# Patient Record
Sex: FEMALE | Race: WHITE
[De-identification: names, ages, dates, MRNs, and addresses within clinical notes are randomized per-mention and may not be internally consistent; named-entity substitution may affect disease eponyms.]

---

## 2021-04-14 ENCOUNTER — HOSPITAL ENCOUNTER (OUTPATIENT)
Dept: HOSPITAL 47 - RADUSWWP | Age: 46
Discharge: HOME | End: 2021-04-14
Attending: FAMILY MEDICINE
Payer: COMMERCIAL

## 2021-04-14 DIAGNOSIS — R16.0: Primary | ICD-10-CM

## 2021-04-14 DIAGNOSIS — K80.20: ICD-10-CM

## 2021-04-14 PROCEDURE — 76700 US EXAM ABDOM COMPLETE: CPT

## 2021-04-14 NOTE — US
EXAMINATION TYPE: US abdomen complete

 

DATE OF EXAM: 4/14/2021

 

COMPARISON: NONE

 

CLINICAL HISTORY: K80.80 cholelithiasis without obstruction. not symptomatic, assess to see if surger
y is needed. 

 

EXAM MEASUREMENTS:

 

Liver Length:  19.0 cm   

Gallbladder Wall:  0.2 cm   

CBD:  0.4 cm

Spleen:  12.7 cm   

Right Kidney:  11.3 x 4.4 x 4.3 cm 

Left Kidney:  11.8 x 5.2 x 5.6 cm   

 

 

 

Pancreas:  limited views appear wnl

Liver:  difficult to penetrate, upper limits of normal for size  

Gallbladder:  multiple stone see, when rolled LLD some appeared to be mobile, no wall thickening   

**Evidence for sonographic Lorenzo's sign:  no

CBD:  wnl 

Spleen:  wnl   

Right Kidney:  wnl   

Left Kidney:  wnl   

Upper IVC:  wnl  

Abd Aorta:  wnl

 

IMPRESSION: 

1. Hepatomegaly.

2. Cholelithiasis.

## 2021-04-30 ENCOUNTER — HOSPITAL ENCOUNTER (OUTPATIENT)
Dept: HOSPITAL 47 - RADMAMWWP | Age: 46
Discharge: HOME | End: 2021-04-30
Attending: FAMILY MEDICINE
Payer: COMMERCIAL

## 2021-04-30 DIAGNOSIS — Z12.31: Primary | ICD-10-CM

## 2021-04-30 PROCEDURE — 77067 SCR MAMMO BI INCL CAD: CPT

## 2021-05-03 NOTE — MM
Reason for exam: screening  (asymptomatic).

Last mammogram was performed 1 year and 8 months ago.



Physical Findings:

A clinical breast exam by your physician is recommended on an annual basis and 

results should be correlated with mammographic findings.



MG Screening Mammo w CAD

Bilateral CC and MLO view(s) were taken.

Prior study comparison: September 13, 2019, bilateral MG screening mammo w CAD.  

October 31, 2017, mammogram, performed at [facility].

The breast tissue is heterogeneously dense. This may lower the sensitivity of 

mammography.  There is no discrete abnormality.





ASSESSMENT: Negative, BI-RAD 1



RECOMMENDATION:

Routine screening mammogram of both breasts in 1 year.

## 2021-07-09 ENCOUNTER — HOSPITAL ENCOUNTER (OUTPATIENT)
Dept: HOSPITAL 47 - OR | Age: 46
Discharge: HOME | End: 2021-07-09
Attending: SURGERY
Payer: COMMERCIAL

## 2021-07-09 VITALS — DIASTOLIC BLOOD PRESSURE: 74 MMHG | HEART RATE: 59 BPM | SYSTOLIC BLOOD PRESSURE: 129 MMHG

## 2021-07-09 VITALS — RESPIRATION RATE: 20 BRPM

## 2021-07-09 VITALS — BODY MASS INDEX: 32.5 KG/M2

## 2021-07-09 VITALS — TEMPERATURE: 97.5 F

## 2021-07-09 DIAGNOSIS — I11.9: ICD-10-CM

## 2021-07-09 DIAGNOSIS — Z79.899: ICD-10-CM

## 2021-07-09 DIAGNOSIS — I83.90: ICD-10-CM

## 2021-07-09 DIAGNOSIS — K80.10: Primary | ICD-10-CM

## 2021-07-09 DIAGNOSIS — E78.5: ICD-10-CM

## 2021-07-09 DIAGNOSIS — K66.0: ICD-10-CM

## 2021-07-09 DIAGNOSIS — E66.9: ICD-10-CM

## 2021-07-09 DIAGNOSIS — G51.0: ICD-10-CM

## 2021-07-09 DIAGNOSIS — E11.9: ICD-10-CM

## 2021-07-09 DIAGNOSIS — Z79.84: ICD-10-CM

## 2021-07-09 LAB
ANION GAP SERPL CALC-SCNC: 9 MMOL/L
BUN SERPL-SCNC: 13 MG/DL (ref 7–17)
CALCIUM SPEC-MCNC: 9.8 MG/DL (ref 8.4–10.2)
CHLORIDE SERPL-SCNC: 101 MMOL/L (ref 98–107)
CO2 SERPL-SCNC: 29 MMOL/L (ref 22–30)
ERYTHROCYTE [DISTWIDTH] IN BLOOD BY AUTOMATED COUNT: 4.73 M/UL (ref 3.8–5.4)
ERYTHROCYTE [DISTWIDTH] IN BLOOD: 14.2 % (ref 11.5–15.5)
GLUCOSE BLD-MCNC: 115 MG/DL (ref 75–99)
GLUCOSE BLD-MCNC: 155 MG/DL (ref 75–99)
GLUCOSE SERPL-MCNC: 111 MG/DL (ref 74–99)
HCT VFR BLD AUTO: 40.9 % (ref 34–46)
HGB BLD-MCNC: 13.7 GM/DL (ref 11.4–16)
MCH RBC QN AUTO: 28.9 PG (ref 25–35)
MCHC RBC AUTO-ENTMCNC: 33.4 G/DL (ref 31–37)
MCV RBC AUTO: 86.5 FL (ref 80–100)
PLATELET # BLD AUTO: 199 K/UL (ref 150–450)
POTASSIUM SERPL-SCNC: 3.8 MMOL/L (ref 3.5–5.1)
SODIUM SERPL-SCNC: 139 MMOL/L (ref 137–145)
WBC # BLD AUTO: 9.9 K/UL (ref 3.8–10.6)

## 2021-07-09 PROCEDURE — 80048 BASIC METABOLIC PNL TOTAL CA: CPT

## 2021-07-09 PROCEDURE — 47564 LAPARO CHOLECYSTECTOMY/EXPLR: CPT

## 2021-07-09 PROCEDURE — 88304 TISSUE EXAM BY PATHOLOGIST: CPT

## 2021-07-09 PROCEDURE — 81025 URINE PREGNANCY TEST: CPT

## 2021-07-09 PROCEDURE — 85027 COMPLETE CBC AUTOMATED: CPT

## 2021-07-09 NOTE — P.OP
Date of Procedure: 07/09/21


Description of Procedure: 








SURGEON:  ASHANTI GARCIA MD





PREOPERATIVE DIAGNOSES:  


1.  Chronic cholecystitis with gallstones


2.  Obesity due to excess calories, BMI 33.0


3.  Hypertensive heart disease


4.  Hyperlipidemia


5.  Diabetes type 2, non-insulin-dependent





POSTOPERATIVE DIAGNOSES:  


1.  Chronic cholecystitis with gallstones


2.  Obesity due to excess calories, BMI 33.0


3.  Hypertensive heart disease


4.  Hyperlipidemia


5.  Diabetes type 2, non-insulin-dependent


6.  Peritoneal adhesions, right upper quadrant





OPERATION:       


1.  Robotic-assisted da Saman Xi laparoscopic lysis of adhesions


2.  Robotic-assisted da Saman Xi laparoscopic cholecystectomy, multiport with 

FIREFLY


 


ESTIMATED BLOOD LOSS:  20 mL.


SPECIMENS REMOVED:  Gallbladder.


COMPLICATIONS:  None.





OPERATIVE FINDINGS:  


1.  Scarring over entire gallbladder with peritoneal adhesions, pericholecystic 

with features of chronic cholecystitis


2.  Generalized oozing from incisions


3.  Dome down technique performed


4.  Common bilde duct confirmed with indocyanine green





INDICATIONS: The patient is a 46-year-old female who presents with symptomatic 

gallstones.  Robotic assisted laparoscopic approach was described. Benefits and 

risks of the procedure including but not limited to bleeding, infection, injury 

to the biliary tree was described. Informed consent was obtained.  





DESCRIPTION OF PROCEDURE: Patient was brought to the operating room, 


placed in supine position. After general induction, the abdomen had 


been prepped and draped in standard sterile fashion. The robotic da Saman 


XI system was primed.  





After a timeout protocol was performed, the patient had been prepped 


and draped in standard sterile fashion.





The patient was injected with indocyanine green.





A 5 mm 0 degrees laparoscopic trocar entry was performed along the left upper 

quadrant.  The abdomen insufflated to 15 mmHg pressure which was tolerated well.

Diagnostic laparoscopy demonstrated no injury to bowel viscera or mesentery.  

The liver surface was unremarkable.  Next, two 8 mm robotic ports were placed 

along the right upper abdomen. The camera 8-mm port was maintained along the 

epigastrium.  Another 8 mm port was placed along the left upper abdominal wall 

after exchanging the 5 mm port. Please note that the ports were placed at least 

10 to 15 cm away from the target anatomy of the gallbladder. 





The robot was docked along the left lateral abdomen. 


The patient was repositioned in reverse Trendelenburg position. 





Using a grasper for arm 3, a grasper for arm 4, including hook cautery for arm 

1, the 


robotic system was docked and primed as described.  


Instruments were interchanged by the assistant including hook cautery, Bovie c

autery and clip appliers.





I had sat at the console. 





The gallbladder was scarred with peritoneal adhesions and bulbous Lysis of 

adhesions was performed to free the gallbladder from the surrounding tissues.  

Dome down technique was proposed starting from the fundus towards the 

infundibulum along its peritoneal attachments due to bulbous gallbladder.  Next 

attention was brought to the infundibulum and cystic structures.  The 

infundibulum and cystic duct were dissected free from surrounding tissues.  The 

cystic duct was isolated.





FIREFLY was used to identify the cystic artery and cystic structures.





A critical view of safety was obtained.





Large PLASTIC clips were used throughout the entire case.


Using a clip applier, 2 clips were placed at the junction of the infundibulum 

and cystic duct.


The cystic duct was divided between clips. Next, the cystic artery


was similarly clipped and cauterized.





Electro-Bovie cautery was used to remove the gallbladder from the


hepatic fossa. Hemostasis was checked and found to be adequate. 





The robot was undocked.





I re-scrubbed into the case.





Using a 10 mm Endo Catch bag via the left upper quadrant incision, the 


specimen was removed from the abdominal cavity.  





All pneumoperitoneum instruments were evacuated from the abdominal 


cavity. The incisions were reapproximated using 4-0 Monocryl in an interrupted 


subcuticular fashion.  Fascial defects were less than 8 mm in size.  


Please note along the trocar sites, local anesthetic was placed as a field block




prior to insertion of all instruments.  





Liquid glue was applied to the skin.





At the end of the procedure needle, sponge, and instrument count had 


been verified correct by the surgical technician. The patient was 


transferred to postanesthesia care unit in stable condition.





Intraoperative films were shared with the patient's family.





Plan - Discharge Summary


Discharge Rx Participant: Yes


New Discharge Prescriptions: 


New


   Acetaminophen Tab [Tylenol Tab] 1,000 mg PO Q6HR PRN #30 tablet


     PRN Reason: Pain


   Simethicone [Gas-X] 125 mg PO AC-TID PRN #20 capsule


     PRN Reason: Pain


   Ibuprofen [Motrin] 600 mg PO Q8HR PRN #30 tab


     PRN Reason: Pain





Continue


   Multivitamins, Thera [Multivitamin (formulary)] 1 tab PO DAILY


   metFORMIN HCL [Glucophage] 500 mg PO PC-LUNCH


   Atorvastatin [Lipitor] 40 mg PO DAILY


   Acyclovir 400 mg PO DAILY


   hydroCHLOROthiazide 50 mg PO DAILY


Discharge Medication List





Acyclovir 400 mg PO DAILY 07/06/21 [History]


Atorvastatin [Lipitor] 40 mg PO DAILY 07/06/21 [History]


Multivitamins, Thera [Multivitamin (formulary)] 1 tab PO DAILY 07/06/21 

[History]


hydroCHLOROthiazide 50 mg PO DAILY 07/06/21 [History]


metFORMIN HCL [Glucophage] 500 mg PO PC-LUNCH 07/06/21 [History]


Acetaminophen Tab [Tylenol Tab] 1,000 mg PO Q6HR PRN #30 tablet 07/09/21 [Rx]


Ibuprofen [Motrin] 600 mg PO Q8HR PRN #30 tab 07/09/21 [Rx]


Simethicone [Gas-X] 125 mg PO AC-TID PRN #20 capsule 07/09/21 [Rx]








Follow up Appointment(s)/Referral(s): 


Ashanti Garcia MD [STAFF PHYSICIAN] - 07/13/21 (CALL FOR TIME)


Patient Instructions/Handouts:  *[facility] - Managing Your Pain After Surgery 

Without Opioids, *[facility] - (Anesthesia) Discharge Instructions Outpatient 

Surgery, Low Fat Diet (DC), Laparoscopic Cholecystectomy (GEN)


Activity/Diet/Wound Care/Special Instructions: 


Recommend low-fat diet for the next 2 days.





No lifting over 10 pounds in 2 weeks until July 23rd.  May shower. No bath tub 

soaks for two weeks until July 23rd. 





Diet as tolerated. 





Use Tylenol, simethicone and ibuprofen or Aleve scheduled for the next 24-48 

hours for best pain relief.





Use ice along incisions for today to prevent swelling.


Discharge Disposition: HOME SELF-CARE

## 2021-07-09 NOTE — P.GSHP
History of Present Illness


H&P Date: 07/09/21











CHIEF COMPLAINT: Cholecystitis 





HISTORY OF PRESENT ILLNESS: The patient is a 34-year-old female who presents 

with history of epigastric including right upper quadrant abdominal pain.  She 

underwent diagnostic studies for her gallbladder.  Separately her clinical 

picture was consistent with cholecystitis.  Now she presents for surgical 

intervention.





PAST MEDICAL HISTORY: 


Please see list





PAST SURGICAL HISTORY: 


Please see list





MEDICATIONS: 


Please see list





ALLERGIES: Please see list





SOCIAL HISTORY: Please see list





FAMILY HISTORY: Please see list





REVIEW OF ORGAN SYSTEMS: 


CONSTITUTIONAL: No reports of fevers or chills. 


HEENT: Denies any troubles with the vision or hearing. 


ENDOCRINE: No reports of hypothyroidism. Has diabetes. 


RESPIRATORY: No recent pneumonias.


CARDIOVASCULAR: Denies chest pain or palpitations. Has hypertension


GI:  No blood in stools or constipation. 


MUSCULOSKELETAL: Has occasional joint pain including back pain. 


NEURO: No seizure disorders or headaches.  No recent stroke.


PSYCH: No depression or suicidal ideation. 


GENITOURINARY: No active blood in urine.  No urinary hesitancy.


HEMATOLOGIC: No personal or family history of DVTs or pulmonary emboli.  


SKIN: No skin cancer.





PHYSICAL EXAM: 


VITAL SIGNS: 


Afebrile vital signs stable


GENERAL: Well-developed pleasant in no acute distress. 


HEENT: No scleral icterus. Extraocular movements grossly intact. Moist buccal 

mucosa. 


NECK: Supple without lymphadenopathy. 


CHEST: Unlabored respirations. Equal bilateral excursions. 


CARDIOVASCULAR: Regular rate regular rhythm rhythm. Distal 2+ pulses. 


ABDOMEN: Soft, nondistended.  Tender along the epigastrium and right upper 

quadrant.


MUSCULOSKELETAL: No clubbing, cyanosis, or edema. 


NEURO: Cranial nerves II to XII within normal limits. No focal or lateralizing 

signs.


PSYCH: Alert and oriented to person, place and time. 


SKIN: Well-perfused good skin turgor.





ASSESSMENT: 


1.  Epigastric and right upper quadrant abdominal pain


2.  Chronic cholecystitis


3.  Symptomatic gallstones.





PLAN: 


1.  Will need a robotic cholecystectomy possible open.  Benefits and risks were 

described. 


2.  Heparin for DVT prophylaxis 5000 units.


3.  Antibiotic prophylaxis.





Past Medical History


Past Medical History: Hyperlipidemia, Hypertension


Additional Past Medical History / Comment(s): states "gallstones" states takes 

metformin "not for diabetes"  hx of hepatitis, hx of varicose veins, hx of bells

palsy


History of Any Multi-Drug Resistant Organisms: None Reported


Past Surgical History: No Surgical Hx Reported


Additional Past Surgical History / Comment(s): colonoscopy, molars removed


Past Anesthesia/Blood Transfusion Reactions: No Reported Reaction


Smoking Status: Never smoker





Medications and Allergies


                                Home Medications











 Medication  Instructions  Recorded  Confirmed  Type


 


Acyclovir 400 mg PO DAILY 07/06/21 07/06/21 History


 


Atorvastatin [Lipitor] 40 mg PO DAILY 07/06/21 07/06/21 History


 


Multivitamins, Thera [Multivitamin 1 tab PO DAILY 07/06/21 07/06/21 History





(formulary)]    


 


hydroCHLOROthiazide 50 mg PO DAILY 07/06/21 07/06/21 History


 


metFORMIN HCL [Glucophage] 500 mg PO PC-LUNCH 07/06/21 07/06/21 History








                                    Allergies











Allergy/AdvReac Type Severity Reaction Status Date / Time


 


No Known Allergies Allergy   Verified 07/06/21 14:42

## 2022-08-10 ENCOUNTER — HOSPITAL ENCOUNTER (OUTPATIENT)
Dept: HOSPITAL 47 - PROCWHC3 | Age: 47
End: 2022-08-10
Attending: PHYSICIAN ASSISTANT
Payer: COMMERCIAL

## 2022-08-10 VITALS — TEMPERATURE: 98.4 F | RESPIRATION RATE: 16 BRPM

## 2022-08-10 VITALS — SYSTOLIC BLOOD PRESSURE: 115 MMHG | DIASTOLIC BLOOD PRESSURE: 83 MMHG | HEART RATE: 88 BPM

## 2022-08-10 DIAGNOSIS — U07.1: Primary | ICD-10-CM

## 2022-08-10 DIAGNOSIS — E66.9: ICD-10-CM

## 2022-09-06 ENCOUNTER — HOSPITAL ENCOUNTER (OUTPATIENT)
Dept: HOSPITAL 47 - RADMAMWWP | Age: 47
Discharge: HOME | End: 2022-09-06
Attending: FAMILY MEDICINE
Payer: COMMERCIAL

## 2022-09-06 DIAGNOSIS — Z12.31: Primary | ICD-10-CM

## 2022-09-06 PROCEDURE — 77063 BREAST TOMOSYNTHESIS BI: CPT

## 2022-09-06 PROCEDURE — 77067 SCR MAMMO BI INCL CAD: CPT

## 2022-09-07 NOTE — MM
Reason for Exam: Screening  (asymptomatic). 

Last mammogram was performed 1 year(s) and 5 month(s) ago. 





Patient History: 

Menarche at age 15. First Full-Term Pregnancy at age 25. Patient has history of breast feeding.

Last menstrual period: 08/22/2022





Risk Values: 

Penelope 5 year model risk: 0.7%.

NCI Lifetime model risk: 8.6%.





Prior Study Comparison: 

2/6/2004 Bilateral Diagnostic Ultrasound, [facility]. 10/31/2017  Screening Mammogram, [facility]. 9/13/2019 Bilateral Screening Mammogram, [facility]. 4/30/2021 Bilateral Screening Mammogram,

[facility]. 





Tissue Density: 

The breast tissue is heterogeneously dense. This may lower the sensitivity of mammography.





Findings: 

Analyzed By CAD. 

No significant changes when compared with prior studies.

Focal asymmetry stable inner left CC view. 





Overall Assessment: Benign, BI-RAD 2





Management: 

Screening Mammogram of both breasts in 1 year.

A clinical breast exam by your physician is recommended on an annual basis and results should be

correlated with mammographic findings.



Electronically signed and approved by: Leopold M. Fregoli, M.D. Radiologis

## 2022-09-17 ENCOUNTER — HOSPITAL ENCOUNTER (OUTPATIENT)
Dept: HOSPITAL 47 - LABWHC1 | Age: 47
Discharge: HOME | End: 2022-09-17
Attending: NURSE PRACTITIONER
Payer: COMMERCIAL

## 2022-09-17 DIAGNOSIS — E78.5: ICD-10-CM

## 2022-09-17 DIAGNOSIS — I10: Primary | ICD-10-CM

## 2022-09-17 DIAGNOSIS — E28.2: ICD-10-CM

## 2022-09-17 LAB
ALBUMIN SERPL-MCNC: 4.6 G/DL (ref 3.8–4.9)
ALBUMIN/GLOB SERPL: 2.11 G/DL (ref 1.6–3.17)
ALP SERPL-CCNC: 80 U/L (ref 41–126)
ALT SERPL-CCNC: 20 U/L (ref 8–44)
ANION GAP SERPL CALC-SCNC: 15.3 MMOL/L (ref 10–18)
AST SERPL-CCNC: 19 U/L (ref 13–35)
BASOPHILS # BLD AUTO: 0.05 X 10*3/UL (ref 0–0.1)
BASOPHILS NFR BLD AUTO: 0.6 %
BUN SERPL-SCNC: 12.1 MG/DL (ref 9–27)
BUN/CREAT SERPL: 16.85 RATIO (ref 12–20)
CALCIUM SPEC-MCNC: 9.9 MG/DL (ref 8.7–10.3)
CHLORIDE SERPL-SCNC: 98 MMOL/L (ref 96–109)
CHOLEST SERPL-MCNC: 138 MG/DL (ref 0–200)
CK SERPL-CCNC: 92 U/L (ref 26–186)
CO2 SERPL-SCNC: 26.5 MMOL/L (ref 20–27.5)
EOSINOPHIL # BLD AUTO: 0.32 X 10*3/UL (ref 0.04–0.35)
EOSINOPHIL NFR BLD AUTO: 4.1 %
ERYTHROCYTE [DISTWIDTH] IN BLOOD BY AUTOMATED COUNT: 4.25 X 10*6/UL (ref 4.1–5.2)
ERYTHROCYTE [DISTWIDTH] IN BLOOD: 13.2 % (ref 11.5–14.5)
GLOBULIN SER CALC-MCNC: 2.2 G/DL (ref 1.6–3.3)
GLUCOSE SERPL-MCNC: 108 MG/DL (ref 70–110)
HCT VFR BLD AUTO: 37.7 % (ref 37.2–46.3)
HDLC SERPL-MCNC: 41.6 MG/DL (ref 40–60)
HGB BLD-MCNC: 12.8 G/DL (ref 12–15)
IMM GRANULOCYTES BLD QL AUTO: 0.5 %
LDLC SERPL CALC-MCNC: 67.2 MG/DL (ref 0–131)
LYMPHOCYTES # SPEC AUTO: 1.68 X 10*3/UL (ref 0.9–5)
LYMPHOCYTES NFR SPEC AUTO: 21.6 %
MCH RBC QN AUTO: 30.1 PG (ref 27–32)
MCHC RBC AUTO-ENTMCNC: 34 G/DL (ref 32–37)
MCV RBC AUTO: 88.7 FL (ref 80–97)
MONOCYTES # BLD AUTO: 0.62 X 10*3/UL (ref 0.2–1)
MONOCYTES NFR BLD AUTO: 8 %
NEUTROPHILS # BLD AUTO: 5.08 X 10*3/UL (ref 1.8–7.7)
NEUTROPHILS NFR BLD AUTO: 65.2 %
NRBC BLD AUTO-RTO: 0 /100 WBCS (ref 0–0)
PLATELET # BLD AUTO: 211 X 10*3/UL (ref 140–440)
POTASSIUM SERPL-SCNC: 3.4 MMOL/L (ref 3.5–5.5)
PROT SERPL-MCNC: 6.8 G/DL (ref 6.2–8.2)
SODIUM SERPL-SCNC: 140 MMOL/L (ref 135–145)
T4 FREE SERPL-MCNC: 1.24 NG/DL (ref 0.8–1.8)
TRIGL SERPL-MCNC: 146 MG/DL (ref 0–149)
VLDLC SERPL CALC-MCNC: 29.2 MG/DL (ref 5–40)
WBC # BLD AUTO: 7.79 X 10*3/UL (ref 4.5–10)

## 2022-09-17 PROCEDURE — 83525 ASSAY OF INSULIN: CPT

## 2022-09-17 PROCEDURE — 84439 ASSAY OF FREE THYROXINE: CPT

## 2022-09-17 PROCEDURE — 80061 LIPID PANEL: CPT

## 2022-09-17 PROCEDURE — 82672 ASSAY OF ESTROGEN: CPT

## 2022-09-17 PROCEDURE — 84402 ASSAY OF FREE TESTOSTERONE: CPT

## 2022-09-17 PROCEDURE — 80053 COMPREHEN METABOLIC PANEL: CPT

## 2022-09-17 PROCEDURE — 82533 TOTAL CORTISOL: CPT

## 2022-09-17 PROCEDURE — 82550 ASSAY OF CK (CPK): CPT

## 2022-09-17 PROCEDURE — 36415 COLL VENOUS BLD VENIPUNCTURE: CPT

## 2022-09-17 PROCEDURE — 84443 ASSAY THYROID STIM HORMONE: CPT

## 2022-09-17 PROCEDURE — 85025 COMPLETE CBC W/AUTO DIFF WBC: CPT

## 2022-09-17 PROCEDURE — 83036 HEMOGLOBIN GLYCOSYLATED A1C: CPT

## 2023-09-18 ENCOUNTER — HOSPITAL ENCOUNTER (OUTPATIENT)
Dept: HOSPITAL 47 - RADMAMWWP | Age: 48
Discharge: HOME | End: 2023-09-18
Attending: FAMILY MEDICINE
Payer: COMMERCIAL

## 2023-09-18 DIAGNOSIS — Z12.31: Primary | ICD-10-CM

## 2023-09-18 PROCEDURE — 77067 SCR MAMMO BI INCL CAD: CPT

## 2023-09-18 PROCEDURE — 77063 BREAST TOMOSYNTHESIS BI: CPT

## 2023-09-18 NOTE — MM
Reason for Exam: Screening  (asymptomatic). 

Last screening mammogram was performed 12 month(s) ago.





Patient History: 

Menarche at age 15. First Full-Term Pregnancy at age 25. Patient has history of breast feeding.

Last menstrual period: 09/11/2023





Risk Values: 

Penelope 5 year model risk: 0.8%.

NCI Lifetime model risk: 8.5%.





Prior Study Comparison: 

9/13/2019 Bilateral Screening Mammogram, [facility]. 4/30/2021 Bilateral Screening Mammogram, [facility]. 9/6/2022

Bilateral MG 3D screening mammo w/cad, [facility]. 





Tissue Density: 

The breast tissue is heterogeneously dense. This may lower the sensitivity of mammography.





Findings: 

Analyzed By CAD. 

There is no suspicious group of microcalcifications or new suspicious mass. 





Overall Assessment: Negative, BI-RAD 1





Management: 

Screening Mammogram of both breasts in 1 year.

[facility] will attempt to contact patient to return for supplemental views and

ultrasound if indicated.



Patient should continue monthly self-breast exams.  A clinical breast exam by your physician is

recommended on an annual basis.

This exam should not preclude additional follow-up of suspicious palpable abnormalities.



Note on Penelope scores and lifetime risk:

1. A Penelope score greater than 3% is considered moderate risk. If this is the case, consider

specialist referral to assess eligibility for a risk reducing agent.

2. If overall lifetime risk for the development of breast cancer is 20% or higher, the patient may

qualify for future screening with alternating mammogram and breast MRI.



Electronically signed and approved by: Sam Alfonso DO

## 2025-06-09 ENCOUNTER — HOSPITAL ENCOUNTER (OUTPATIENT)
Dept: HOSPITAL 47 - RADMAMWWP | Age: 50
Discharge: HOME | End: 2025-06-09
Attending: FAMILY MEDICINE
Payer: COMMERCIAL

## 2025-06-09 DIAGNOSIS — Z92.0: ICD-10-CM

## 2025-06-09 DIAGNOSIS — Z12.31: Primary | ICD-10-CM

## 2025-06-09 DIAGNOSIS — R92.333: ICD-10-CM

## 2025-06-09 PROCEDURE — 77063 BREAST TOMOSYNTHESIS BI: CPT

## 2025-06-09 PROCEDURE — 77067 SCR MAMMO BI INCL CAD: CPT

## 2025-06-11 ENCOUNTER — HOSPITAL ENCOUNTER (OUTPATIENT)
Dept: HOSPITAL 47 - RADMAMWWP | Age: 50
Discharge: HOME | End: 2025-06-11
Attending: FAMILY MEDICINE
Payer: COMMERCIAL

## 2025-06-11 DIAGNOSIS — Z92.0: ICD-10-CM

## 2025-06-11 DIAGNOSIS — R92.8: Primary | ICD-10-CM
